# Patient Record
Sex: MALE | NOT HISPANIC OR LATINO | URBAN - METROPOLITAN AREA
[De-identification: names, ages, dates, MRNs, and addresses within clinical notes are randomized per-mention and may not be internally consistent; named-entity substitution may affect disease eponyms.]

---

## 2018-11-07 ENCOUNTER — OFFICE VISIT - HEALTHEAST (OUTPATIENT)
Dept: CARDIOLOGY | Facility: CLINIC | Age: 43
End: 2018-11-07

## 2018-11-07 DIAGNOSIS — R00.2 PALPITATIONS: ICD-10-CM

## 2018-11-07 ASSESSMENT — MIFFLIN-ST. JEOR: SCORE: 1776.69

## 2018-11-21 ENCOUNTER — HOSPITAL ENCOUNTER (OUTPATIENT)
Dept: CARDIOLOGY | Facility: CLINIC | Age: 43
Discharge: HOME | End: 2018-11-21
Attending: INTERNAL MEDICINE

## 2018-11-21 DIAGNOSIS — R00.2 PALPITATIONS: ICD-10-CM

## 2018-11-21 LAB
AORTIC ROOT: 3 CM
AORTIC VALVE MEAN VELOCITY: 106 CM/S
ASCENDING AORTA: 3.3 CM
AV DIMENSIONLESS INDEX VTI: 0.7
AV MEAN GRADIENT: 5 MMHG
AV PEAK GRADIENT: 9.5 MMHG
AV VALVE AREA: 3.4 CM2
AV VELOCITY RATIO: 0.8
BSA FOR ECHO PROCEDURE: 2.08 M2
CV BLOOD PRESSURE: NORMAL MMHG
CV ECHO HEIGHT: 68 IN
CV ECHO WEIGHT: 200 LBS
DOP CALC AO PEAK VEL: 154 CM/S
DOP CALC AO VTI: 29 CM
DOP CALC LVOT AREA: 4.52 CM2
DOP CALC LVOT DIAMETER: 2.4 CM
DOP CALC LVOT PEAK VEL: 117 CM/S
DOP CALC LVOT STROKE VOLUME: 97.2 CM3
DOP CALCLVOT PEAK VEL VTI: 21.5 CM
EJECTION FRACTION: 67 % (ref 55–75)
FRACTIONAL SHORTENING: 44.9 % (ref 28–44)
INTERVENTRICULAR SEPTUM IN END DIASTOLE: 0.92 CM (ref 0.6–1)
IVS/PW RATIO: 1
LA AREA 1: 19.6 CM2
LA AREA 2: 23.9 CM2
LEFT ATRIUM LENGTH: 6.26 CM
LEFT ATRIUM SIZE: 3.5 CM
LEFT ATRIUM VOLUME INDEX: 30.6 ML/M2
LEFT ATRIUM VOLUME: 63.6 ML
LEFT VENTRICLE CARDIAC INDEX: 2.9 L/MIN/M2
LEFT VENTRICLE CARDIAC OUTPUT: 6 L/MIN
LEFT VENTRICLE DIASTOLIC VOLUME INDEX: 34.6 CM3/M2 (ref 34–74)
LEFT VENTRICLE DIASTOLIC VOLUME: 72 CM3 (ref 62–150)
LEFT VENTRICLE HEART RATE: 62 BPM
LEFT VENTRICLE MASS INDEX: 73.1 G/M2
LEFT VENTRICLE SYSTOLIC VOLUME INDEX: 11.5 CM3/M2 (ref 11–31)
LEFT VENTRICLE SYSTOLIC VOLUME: 24 CM3 (ref 21–61)
LEFT VENTRICULAR INTERNAL DIMENSION IN DIASTOLE: 4.86 CM (ref 4.2–5.8)
LEFT VENTRICULAR INTERNAL DIMENSION IN SYSTOLE: 2.68 CM (ref 2.5–4)
LEFT VENTRICULAR MASS: 152.1 G
LEFT VENTRICULAR OUTFLOW TRACT MEAN GRADIENT: 3 MMHG
LEFT VENTRICULAR OUTFLOW TRACT MEAN VELOCITY: 76.4 CM/S
LEFT VENTRICULAR OUTFLOW TRACT PEAK GRADIENT: 5 MMHG
LEFT VENTRICULAR POSTERIOR WALL IN END DIASTOLE: 0.89 CM (ref 0.6–1)
LV STROKE VOLUME INDEX: 46.7 ML/M2
MITRAL VALVE E/A RATIO: 1.2
MV AVERAGE E/E' RATIO: 4.7 CM/S
MV DECELERATION TIME: 391 MS
MV E'TISSUE VEL-LAT: 14 CM/S
MV E'TISSUE VEL-MED: 8.29 CM/S
MV LATERAL E/E' RATIO: 3.7
MV MEDIAL E/E' RATIO: 6.3
MV PEAK A VELOCITY: 45.2 CM/S
MV PEAK E VELOCITY: 52.1 CM/S
NUC REST DIASTOLIC VOLUME INDEX: 3200 LBS
NUC REST SYSTOLIC VOLUME INDEX: 68 IN
PR MAX PG: 6 MMHG
PR PEAK VELOCITY: 124 CM/S
TRICUSPID VALVE ANULAR PLANE SYSTOLIC EXCURSION: 1.9 CM

## 2018-11-21 ASSESSMENT — MIFFLIN-ST. JEOR: SCORE: 1776.69

## 2018-11-27 ENCOUNTER — COMMUNICATION - HEALTHEAST (OUTPATIENT)
Dept: CARDIOLOGY | Facility: CLINIC | Age: 43
End: 2018-11-27

## 2021-06-02 VITALS — WEIGHT: 200 LBS | HEIGHT: 68 IN | BODY MASS INDEX: 30.31 KG/M2

## 2021-06-02 VITALS — WEIGHT: 200 LBS | BODY MASS INDEX: 30.31 KG/M2 | HEIGHT: 68 IN

## 2021-06-21 NOTE — PROGRESS NOTES
[facility] Note    Assessment/Plan:    Luis Richards is a 43 y.o. old male with:  1. Palpitations - normal ECG, will start with echo and holter, encouraged to stop tob, EtoH and change coffee to decaf, start exercise 15 min a day.  Avoid herbal medication.          Dr. Solomon Loza  [facility]  [phone]  ______________________________________________________________________    Subjective:    I had the opportunity to see Luis Richards at the [facility]. Luis Richards is a 43 y.o. male with a known history of papitations for the past month, he has mild asthma, depression, anxiety.  6 mo changed down on sertraline dose.  Drinks 3 cups coffee a day, EtOH 3 drinks a day, no decongestants, no drugs, no herbal medications.  No long car or plane trips.  Works as  at [employer] in  [place].  No GI/ bleeding, dypnea.  He has chest pain from GERD related to sertraline, and he feels mild chest pain with palpitaitons.  No problems with exercise, no change in exercise tolerance for the past month.  No syncopal events. Smoke 2 cigarettes a day.  Palpitations occur randomly during the day noticed more when sitting or relaxing.  Worse when sleep on the right side. No URI last month. No change in stress at home or work last several months, one son doing well.   Rarely uses inhaler. No family hx of heart dz or sudden death.  ______________________________________________________________________      Review of Systems:   General: WNL  Eyes: WNL  Ears/Nose/Throat: WNL  Lungs: Snoring, Wheezing  Heart: Chest Pain  Stomach: WNL  Bladder: WNL  Muscle/Joints: WNL  Skin: WNL  Nervous System: WNL  Mental Health: Depression, Anxiety     Blood: WNL    Problem List:  There is no problem list on file for this patient.    Medical History:  No past medical history on file.  Surgical History:  No past surgical history on file.  Social History:  No pertinent social hx  "related to patient's chief complaint other than above in subjective        Family History:  No pertinent family hx related to patient's chief complaint other than above in subjective      Allergies:  Allergies   Allergen Reactions     Ibuprofen Nausea And Vomiting       Medications:  Current Outpatient Prescriptions   Medication Sig Dispense Refill     albuterol (PROAIR HFA;PROVENTIL HFA;VENTOLIN HFA) 90 mcg/actuation inhaler Inhale 2 puffs every 6 (six) hours as needed for wheezing.       omeprazole (PRILOSEC) 40 MG capsule Take 40 mg by mouth daily before breakfast.       sertraline (ZOLOFT) 100 MG tablet Take 100 mg by mouth daily.       No current facility-administered medications for this visit.        Objective:   Vital signs:  /80 (Patient Site: Left Arm, Patient Position: Sitting, Cuff Size: Adult Large)  Pulse 68  Resp 16  Ht 5' 8\" (1.727 m)  Wt 200 lb (90.7 kg)  BMI 30.41 kg/m2      Physical Exam:    GENERAL APPEARANCE: Alert, cooperative and in no acute distress.  HEENT: No scleral icterus. No Xanthelasma. Oral mucuos membranes pink and moist.  NECK: JVP<6cm. No Hepatojugular reflux. Thyroid not visualized  CHEST: clear to auscultation  CARDIOVASCULAR: S1, S2 without murmur ,clicks or rubs. Brachial, radial and posterior tibial pulses are intact and symetric. No carotid bruits noted.    ABDOMEN: Nontender. BS+. No bruits.  EXTREMITIES: No cyanosis, clubbing or edema.    Lab Results:  LIPIDS:  No results found for: CHOL  No results found for: HDL  No results found for: LDLCALC  No results found for: TRIG  No components found for: CHOLHDL    BMP:  Lab Results   Component Value Date    CREATININE 1.04 10/31/2018    BUN 11 10/31/2018     10/31/2018    K 4.1 10/31/2018     10/31/2018    CO2 26 10/31/2018         Solomon Loza MD  [facility]  [phone]              "

## 2021-06-27 ENCOUNTER — HEALTH MAINTENANCE LETTER (OUTPATIENT)
Age: 46
End: 2021-06-27

## 2021-10-17 ENCOUNTER — HEALTH MAINTENANCE LETTER (OUTPATIENT)
Age: 46
End: 2021-10-17

## 2022-07-24 ENCOUNTER — HEALTH MAINTENANCE LETTER (OUTPATIENT)
Age: 47
End: 2022-07-24

## 2022-10-02 ENCOUNTER — HEALTH MAINTENANCE LETTER (OUTPATIENT)
Age: 47
End: 2022-10-02

## 2023-08-12 ENCOUNTER — HEALTH MAINTENANCE LETTER (OUTPATIENT)
Age: 48
End: 2023-08-12